# Patient Record
Sex: FEMALE | Race: WHITE
[De-identification: names, ages, dates, MRNs, and addresses within clinical notes are randomized per-mention and may not be internally consistent; named-entity substitution may affect disease eponyms.]

---

## 2017-08-24 ENCOUNTER — HOSPITAL ENCOUNTER (OUTPATIENT)
Dept: HOSPITAL 39 - GMA | Age: 52
Discharge: HOME | End: 2017-08-24
Attending: FAMILY MEDICINE
Payer: COMMERCIAL

## 2017-08-24 DIAGNOSIS — R53.83: Primary | ICD-10-CM

## 2017-08-25 ENCOUNTER — HOSPITAL ENCOUNTER (OUTPATIENT)
Dept: HOSPITAL 39 - US | Age: 52
Discharge: HOME | End: 2017-08-25
Attending: PHYSICIAN ASSISTANT
Payer: COMMERCIAL

## 2017-08-25 DIAGNOSIS — R60.0: Primary | ICD-10-CM

## 2017-08-25 DIAGNOSIS — Z97.4: ICD-10-CM

## 2017-08-27 NOTE — US
EXAM DESCRIPTION:  Venous,Lower Extremity LT



CLINICAL HISTORY:  52 years  Female  R/O DVT



COMPARISON:  None.



TECHNIQUE:  Duplex imaging performed to evaluate the left lower

extremity venous structures. Compression imaging and augmentation

imaging performed. The common femoral, superficial femoral,

popliteal, greater saphenous and posterior tibial veins were

examined.



FINDINGS:



No thrombus is identified in the left lower extremity venous

structures. 



IMPRESSION:



No DVT is identified in the left lower extremity. 



Electronically signed by:  Julieta Gray  8/27/2017 7:23 PM CDT

Workstation: [id number]

## 2017-08-27 NOTE — US
EXAM DESCRIPTION:  Venous,Lower Extremity RT



CLINICAL HISTORY:  52 years  Female  R/O DVT



COMPARISON:  None.



TECHNIQUE:  Duplex imaging performed to evaluate the right lower

extremity venous structures. Compression imaging and augmentation

imaging performed. The common femoral, superficial femoral,

popliteal, greater saphenous and posterior tibial veins were

examined.



FINDINGS:



No thrombus is identified in the right lower extremity venous

structures. 



IMPRESSION:



No DVT is identified in the right lower extremity. 



Electronically signed by:  Julieta Gray  8/27/2017 7:22 PM CDT

Workstation: [id number]

## 2019-02-11 ENCOUNTER — HOSPITAL ENCOUNTER (OUTPATIENT)
Dept: HOSPITAL 39 - MAMMO | Age: 54
End: 2019-02-11
Attending: NURSE PRACTITIONER
Payer: COMMERCIAL

## 2019-02-11 DIAGNOSIS — Z12.31: Primary | ICD-10-CM

## 2019-02-11 NOTE — MAM
EXAM DESCRIPTION: 

3D Screening BILATERAL : Digital Mammography.



CLINICAL HISTORY: 

53 years Female ANNUAL SCREENING . No complaints. No personal

history of breast cancer. Mother with breast cancer. Childbirth.

Postmenopausal 5 years. No HRT..  Lifetime risk of developing

breast cancer (Tyrer-Cuzick model)(%):  13.9.



COMPARISON: 

2-D digital screening bilateral mammography 11/7/2016.   



TECHNIQUE: 

Bilateral CC and MLO projection full-field images, digital

tomosynthesis mammographic technique. Bilateral digital 2-D

full-field MLO images. CAD not available for tomosynthesis or 2-D

images.  



FINDINGS: 

The breast parenchymal density pattern is: Heterogeneously dense

breast tissue, which may obscure small masses. No skin thickening

or nipple retraction.  Bilateral solitary microcalcifications.

No new focal, stellate mass or density, focal asymmetry , and no

suspicious microcalcifications bilaterally. Stable mammograms

compared to prior study.  Taking into account, differences in

mammographic technique.



IMPRESSION: 

Benign exam.



BIRAD CATEGORY: 2 BENIGN FINDINGS.



RECOMMENDATIONS:

FOLLOW UP: Routine digital bilateral  mammographic screening, one

year interval from  



2019.



Written communication explaining the IMPRESSION and follow-up,

will be mailed to the patient and referring health care provider.



According to the [employer], yearly mammograms

are recommended starting at age 40 and continuing as long as a

woman is in good health.  Any breast change noted on a breast

self-exam should be reported promptly to the patient's healthcare

provider.  Breast MRI is recommended for women with an

approximately 20-25% or greater lifetime risk of breast cancer,

including women with a strong family history of breast or ovarian

cancer and women who have been treated for Hodgkin's disease.



A negative mammographic report should not delay tissue diagnosis

in patients with significant clinical history or physical

findings.



Extremely dense breast tissue limits the sensitivity of digital

mammography. 



Electronically signed by:  Juve Madrigal MD  2/11/2019 4:36 PM CST

Workstation: [id number]